# Patient Record
Sex: MALE | Race: WHITE | URBAN - METROPOLITAN AREA
[De-identification: names, ages, dates, MRNs, and addresses within clinical notes are randomized per-mention and may not be internally consistent; named-entity substitution may affect disease eponyms.]

---

## 2017-05-30 ENCOUNTER — OFFICE VISIT (OUTPATIENT)
Dept: INTERNAL MEDICINE | Facility: CLINIC | Age: 32
End: 2017-05-30
Payer: COMMERCIAL

## 2017-05-30 VITALS
HEART RATE: 97 BPM | WEIGHT: 281.3 LBS | OXYGEN SATURATION: 97 % | DIASTOLIC BLOOD PRESSURE: 84 MMHG | SYSTOLIC BLOOD PRESSURE: 128 MMHG | TEMPERATURE: 98.3 F

## 2017-05-30 DIAGNOSIS — S39.011D STRAIN OF ABDOMINAL MUSCLE, SUBSEQUENT ENCOUNTER: Primary | ICD-10-CM

## 2017-05-30 PROCEDURE — 99213 OFFICE O/P EST LOW 20 MIN: CPT | Performed by: FAMILY MEDICINE

## 2017-05-30 NOTE — MR AVS SNAPSHOT
"              After Visit Summary   2017    Niranjan Newman    MRN: [id number]           Patient Information     Date Of Birth          1985        Visit Information        Provider Department      2017 3:40 PM Alexx Choudhary MD [facility]        Today's Diagnoses     Strain of abdominal muscle, subsequent encounter    -  1       Follow-ups after your visit        Who to contact     If you have questions or need follow up information about today's clinic visit or your schedule please contact [facility] directly at [phone].  Normal or non-critical lab and imaging results will be communicated to you by [employer], letter or phone within 4 business days after the clinic has received the results. If you do not hear from us within 7 days, please contact the clinic through [employer] or phone. If you have a critical or abnormal lab result, we will notify you by phone as soon as possible.  Submit refill requests through [employer] or call your pharmacy and they will forward the refill request to us. Please allow 3 business days for your refill to be completed.          Additional Information About Your Visit        MyChart Information     [employer] lets you send messages to your doctor, view your test results, renew your prescriptions, schedule appointments and more. To sign up, go to [email or web address] . Click on \"Log in\" on the left side of the screen, which will take you to the Welcome page. Then click on \"Sign up Now\" on the right side of the page.     You will be asked to enter the access code listed below, as well as some personal information. Please follow the directions to create your username and password.     Your access code is: [email or web address]  Expires: 2017  5:31 PM     Your access code will  in 90 days. If you need help or a new code, please call your [facility] or [phone].        Care EveryWhere ID     This is your Care EveryWhere ID. This " could be used by other organizations to access your [facility] medical records  [id number]        Your Vitals Were     Pulse Temperature Pulse Oximetry             97 98.3  F (36.8  C) (Oral) 97%          Blood Pressure from Last 3 Encounters:   05/30/17 128/84   08/26/15 136/90    Weight from Last 3 Encounters:   05/30/17 281 lb 4.8 oz (127.6 kg)              Today, you had the following     No orders found for display       Primary Care Provider    None Specified       No primary provider on file.        Thank you!     Thank you for choosing [facility]  for your care. Our goal is always to provide you with excellent care. Hearing back from our patients is one way we can continue to improve our services. Please take a few minutes to complete the written survey that you may receive in the mail after your visit with us. Thank you!             Your Updated Medication List - Protect others around you: Learn how to safely use, store and throw away your medicines at www.disposemymeds.org.      Notice  As of 5/30/2017  5:31 PM    You have not been prescribed any medications.

## 2017-05-30 NOTE — LETTER
[place] [address]  [place] [facility] [address]  [phone]      May 30, 2017      Niranjan Newman  [address]  [address]  [place] [address]        Dear Niranjan,      You were seen today for injury follow up. You have recovered.    OK to return to work as of today, May 30, 2017.    No restrictions.        Sincerely,          Alexx Choudhary MD

## 2017-05-30 NOTE — NURSING NOTE
Chief Complaint   Patient presents with     Forms     Return work letter. Torn abd muscle. Has not worked x's 1.5wk       Initial /84 (BP Location: Right arm, Patient Position: Chair, Cuff Size: Adult Large)  Pulse 97  Temp 98.3  F (36.8  C) (Oral)  Wt 281 lb 4.8 oz (127.6 kg)  SpO2 97% There is no height or weight on file to calculate BMI.  Medication Reconciliation: complete     Shantell Lucero, CMA

## 2017-05-30 NOTE — PROGRESS NOTES
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 He strained L lower abdominal muscle about 15 days ago. He has been off but now feels improved.    There is no problem list on file for this patient.      REVIEW OF SYSTEMS    Unremarkable except as above.      EXAM  /84 (BP Location: Right arm, Patient Position: Chair, Cuff Size: Adult Large)  Pulse 97  Temp 98.3  F (36.8  C) (Oral)  Wt 281 lb 4.8 oz (127.6 kg)  SpO2 97%    Overweight  Moving comfortably.  Spine ROM is normal.  Abd: non tender including exam during partial sit up.      (S39.011D) Strain of abdominal muscle, subsequent encounter  (primary encounter diagnosis)  Comment: resolved  Plan:   OK for RTW. No restrictions.    Note prepared.

## 2017-05-31 ASSESSMENT — PATIENT HEALTH QUESTIONNAIRE - PHQ9: SUM OF ALL RESPONSES TO PHQ QUESTIONS 1-9: 0

## 2023-10-25 ENCOUNTER — HOSPITAL ENCOUNTER (OUTPATIENT)
Dept: BEHAVIORAL HEALTH | Facility: CLINIC | Age: 38
Discharge: HOME | End: 2023-10-25
Attending: FAMILY MEDICINE | Admitting: FAMILY MEDICINE
Payer: COMMERCIAL

## 2023-10-25 VITALS — BODY MASS INDEX: 36.68 KG/M2 | HEIGHT: 71 IN | WEIGHT: 262 LBS

## 2023-10-25 PROCEDURE — H0001 ALCOHOL AND/OR DRUG ASSESS: HCPCS

## 2023-10-25 RX ORDER — ATORVASTATIN CALCIUM 10 MG/1
10 TABLET, FILM COATED ORAL DAILY
COMMUNITY

## 2023-10-25 RX ORDER — CETIRIZINE HYDROCHLORIDE, PSEUDOEPHEDRINE HYDROCHLORIDE 5; 120 MG/1; MG/1
1 TABLET, FILM COATED, EXTENDED RELEASE ORAL DAILY
COMMUNITY

## 2023-10-25 RX ORDER — GLIPIZIDE 5 MG/1
5 TABLET, FILM COATED, EXTENDED RELEASE ORAL DAILY
COMMUNITY

## 2023-10-25 RX ORDER — GABAPENTIN 100 MG/1
100 CAPSULE ORAL 3 TIMES DAILY
COMMUNITY

## 2023-10-25 ASSESSMENT — ANXIETY QUESTIONNAIRES
7. FEELING AFRAID AS IF SOMETHING AWFUL MIGHT HAPPEN: NOT AT ALL
3. WORRYING TOO MUCH ABOUT DIFFERENT THINGS: NOT AT ALL
1. FEELING NERVOUS, ANXIOUS, OR ON EDGE: NOT AT ALL
5. BEING SO RESTLESS THAT IT IS HARD TO SIT STILL: NOT AT ALL
GAD7 TOTAL SCORE: 0
6. BECOMING EASILY ANNOYED OR IRRITABLE: NOT AT ALL
GAD7 TOTAL SCORE: 0
2. NOT BEING ABLE TO STOP OR CONTROL WORRYING: NOT AT ALL
4. TROUBLE RELAXING: NOT AT ALL

## 2023-10-25 ASSESSMENT — PATIENT HEALTH QUESTIONNAIRE - PHQ9: SUM OF ALL RESPONSES TO PHQ QUESTIONS 1-9: 0

## 2023-10-25 ASSESSMENT — PAIN SCALES - GENERAL: PAINLEVEL: NO PAIN (0)

## 2023-10-25 ASSESSMENT — COLUMBIA-SUICIDE SEVERITY RATING SCALE - C-SSRS
2. HAVE YOU ACTUALLY HAD ANY THOUGHTS OF KILLING YOURSELF?: NO
1. HAVE YOU WISHED YOU WERE DEAD OR WISHED YOU COULD GO TO SLEEP AND NOT WAKE UP?: NO
TOTAL  NUMBER OF ABORTED OR SELF INTERRUPTED ATTEMPTS LIFETIME: NO
6. HAVE YOU EVER DONE ANYTHING, STARTED TO DO ANYTHING, OR PREPARED TO DO ANYTHING TO END YOUR LIFE?: NO
TOTAL  NUMBER OF INTERRUPTED ATTEMPTS LIFETIME: NO
ATTEMPT LIFETIME: NO

## 2023-10-25 NOTE — PROGRESS NOTES
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 behaviors.    The patient The patient reported currently being registered as a sex offender.  The patient reported being arrested for a third degree criminal sexual conduct charge on 2/28/2006 when was 20-years old and he reported being in a consensual relationship with a 14-year old female.  The patient reported he is currently registered at the lowest level for a sex offender in the [facility].   The patient denied a history of risk behaviors associated with substance use.  The patient denies any history of high risk behaviors associated with mental health symptoms.  The patient reported the following protective factors: positive relationships positive social network and positive family connections, forward/future oriented thinking, safe and stable environment, adherence with prescribed medication, living with other people, and daily obligations.    Risk Plan:  See Recommendations for Safety and Risk Management Plan    Review of Symptoms per patient report:  Substance Use:  No symptoms.    Diagnostic Criteria:   The patient does not currently identity positively with any of the 11 DSM-5 criteria for a diagnostic impression of having a substance use disorder.    Collateral Contact Summary:    Collateral contacts contributing to this assessment:  The patient's electronic medical records were reviewed at time of assessment.    The collateral data provided by this patient's [facility] , Teresa Stuart, supported the patient's account of his chemical use history and his chemical use consequences.  Teresa confirmed the patient was on court probation for a Domestic Assault charge on 6/19/2023 with his girlfriend/significant other, which had been lowered to a disorderly conduct charge in court.  Teresa confirmed the patient had also self-reported to her his last use of alcohol was around 2 years ago and she confirmed there was nothing in the arrest report to indicate the patient had been under the influence of mood altering chemicals on the day of the incident on 6/19/2023.  Teresa also confirmed the patient's prior legal charges, including his third degree criminal sexual conduct charge on 2/28/2006, when he was 20-years old and he reported being in a consensual sexual relationship with a 14-year old female.  Teresa stated the patient's next court date was scheduled for late 11/2023.     This counselor talked with this patient's girlfriend/significant other, Rosario Jones, on 10/26/2023 at 2:35 pm.  Rosario stated she had known the patient for the past 10 years, but they had been dating for the past 3 and a half years.  Rosario stated she had never had any concerns about the patient having a problem with alcohol since she had known him and she stated to her best awareness his last use of alcohol was around 2-years ago when they had shared a bottle of wine on their anniversary.  Rosario stated to her best awareness the patient had not been drinking any alcohol or using any other non-prescribed mood altering chemicals on the day he was arrested for a Domestic Assault on 6/19/2023.      No additional collateral data had been obtained at the time of this documentation.     If court related records  were reviewed, summarize here: None    Information from collateral contacts supported/largely agreed with information from the client and associated risk ratings.    Information in this assessment was obtained from the medical record and provided by the patient who is a good historian.        The patient will have open access to his substance use disorder assessment medical record.    As evidenced by self report and criteria, the patient meets the following DSM-5 Diagnoses: (Sustained by DSM-5 Criteria Listed Above)      The patient does not currently identify with a DSM-5 substance use disorder.    Specify if: In early remission:  After full criteria for alcohol/drug use disorder were previously met, none of the criteria for alcohol/drug use disorder have been met for at least 3 months but for less than 12 months (with the exception that Criterion A4,  Craving or a strong desire or urge to use alcohol/drug  may be met).     In sustained remission:   After full criteria for alcohol use disorder were previously met, none of the criteria for alcohol/drug use disorder have been met at any time during a period of 12 months or longer (with the exception that Criterion A4,  Craving or strong desire or urge to use alcohol/drug  may be met).     Specify if:   This additional specifier is used if the individual is in an environment where access to alcohol is restricted.    Mild: Presence of 2-3 symptoms  Moderate: Presence of 4-5 symptoms  Severe: Presence of 6 or more symptoms    Recommendations:     1. Plan for Safety and Risk Management:     Recommended that patient call 911 or go to the local ED should there be a change in any of these risk factors..            Report to child / adult protection services was not needed.    2. [facility]DA Referrals:      Recommendations:      1.)  It was recommended the patient continue to abstain from alcohol and from all other non-prescribed mood altering chemicals.   2.)  Follow all of the  recommendations of his healthcare providers.  3.)  Follow all of the terms and conditions of his court probation, including participating in random alcohol and drug screening with court probation as directed.      The patient reported he was willing to follow the above recommendations.      The patient would like the following family or other support people involved in their treatment:  None at this time.  The patient does not have any history of opioid abuse.      3.  Mental Health Referrals:     The patient did not appear to be in any need of a mental health referral at this time.    4. Clinical Substantiation for the above recommendations: The collateral data provided by this patient's girlfriend/significant other, Rosario Jones, and by this patient's [facility] , Teresa Stuart, supported the patient's account of his chemical use history and his chemical use consequences.   The collateral data provided by this patient's [facility] , Teresa Stuart, supported the patient's account of his chemical use history and his chemical use consequences.  Teresa confirmed the patient was on court probation for a Domestic Assault charge on 6/19/2023 with his girlfriend/significant other, which had been lowered to a disorderly conduct charge in court.  Teresa confirmed the patient had also self-reported to her his last use of alcohol was around 2 years ago and she confirmed there was nothing in the arrest report to indicate the patient had been under the influence of mood altering chemicals on the day of the incident on 6/19/2023.  Teresa also confirmed the patient's prior legal charges, including his third degree criminal sexual conduct charge on 2/28/2006, when he was 20-years old and he reported being in a consensual sexual relationship with a 14-year old female.  Teresa stated the patient's next court date was scheduled for late 11/2023.   This counselor talked with this patient's  girlfriend/significant other, Rosario Jones, on 10/26/2023 at 2:35 pm.  Rosario stated she had known the patient for the past 10 years, but they had been dating for the past 3 and a half years.  Rosario stated she had never had any concerns about the patient having a problem with alcohol since she had known him and she stated to her best awareness his last use of alcohol was around 2-years ago when they had shared a bottle of wine on their anniversary.  Rosario stated to her best awareness the patient had not been drinking any alcohol or using any other non-prescribed mood altering chemicals on the day he was arrested for a Domestic Assault on 6/19/2023.  The resulting primary recommendation was for the patient to continue to abstain from alcohol and from all other non-prescribed mood altering chemicals.       5.  Cultural Concerns:    The patient did not identify having any cultural concerns regarding mental health, physical health, or substance use issues.     6. Recommendations for treatment focus:      Alcohol / Substance Use - See #2. AMANDA Referrals above for details on recommendations.    7. Interactive Complexity: No    8. Safety Plan:  Patient denied any current/recent/lifetime history of suicidal ideation and/or behaviors.  No safety plan indicated at this time.     Provider Name/ Credentials:  FARHAT Cruz  October 25, 2023

## 2023-10-26 NOTE — ADDENDUM NOTE
Encounter addended by: bAhay Kingston [facility] on: 10/26/2023 2:58 PM   Actions taken: Clinical Note Signed